# Patient Record
Sex: MALE | Race: WHITE
[De-identification: names, ages, dates, MRNs, and addresses within clinical notes are randomized per-mention and may not be internally consistent; named-entity substitution may affect disease eponyms.]

---

## 2019-01-14 ENCOUNTER — HOSPITAL ENCOUNTER (EMERGENCY)
Dept: HOSPITAL 41 - JD.ED | Age: 24
Discharge: HOME | End: 2019-01-14
Payer: COMMERCIAL

## 2019-01-14 DIAGNOSIS — J11.1: Primary | ICD-10-CM

## 2019-01-14 DIAGNOSIS — F17.210: ICD-10-CM

## 2019-01-14 NOTE — EDM.PDOC
ED HPI GENERAL MEDICAL PROBLEM





- General


Chief Complaint: General


Stated Complaint: FLU LIKE SYMPTOMS


Time Seen by Provider: 01/14/19 19:09


Source of Information: Reports: Patient


History Limitations: Reports: No Limitations





- History of Present Illness


INITIAL COMMENTS - FREE TEXT/NARRATIVE: 





23-year-old male attends the ED with acute onset of upper respiratory tract 

symptoms. He states shortly after going to work this morning he developed 

generalized body aches headache, and fever. Also has developed a paroxysmal 

relatively nonproductive cough. States his throat is little scratchy. Is a neat 

and all day but he is hungry. Denies any nausea vomiting or diarrhea. He states 

many of his coworkers have been recently diagnosed with influenza. He did not 

have a flu shot.


Onset: Today


Onset Date: 01/14/19


Onset Time: 08:00


Duration: Hour(s):


Location: Reports: Chest (Paroxysmal nonproductive cough with mild sore throat)

, Generalized


Quality: Reports: Other


Severity: Moderate (Generalized body ache and headache)


Improves with: Reports: None


Worsens with: Reports: None


Context: Denies: Activity, Exercise, Lifting, Sick Contact, Trauma


Associated Symptoms: Reports: Cough, cough w sputum, Fever/Chills, Headaches, 

Malaise.  Denies: No Other Symptoms, Confusion, Chest Pain (Scant sputum 

production), Loss of Appetite, Nausea/Vomiting, Rash, Seizure, Shortness of 

Breath, Syncope


Treatments PTA: Reports: Other (see below)


  ** Generalized


Pain Score (Numeric/FACES): 4





- Related Data


 Allergies











Allergy/AdvReac Type Severity Reaction Status Date / Time


 


No Known Allergies Allergy   Verified 01/14/19 19:06











Home Meds: 


 Home Meds





Hydrocodone/Chlorphen P-Stirex [Tussionex Pennkinetic Susp] 5 ml PO Q12H PRN #

60 ml 01/14/19 [Rx]


Oseltamivir [Tamiflu] 75 mg PO BID #10 cap 01/14/19 [Rx]











Past Medical History


Genitourinary History: Reports: Other (See Below)


Other Genitourinary History: kidney stones





- Past Surgical History


HEENT Surgical History: Reports: Tonsillectomy





Social & Family History





- Family History


Family Medical History: Noncontributory





- Caffeine Use


Caffeine Use: Reports: Soda





- Living Situation & Occupation


Living situation: Reports: 


Occupation: Employed





ED ROS GENERAL





- Review of Systems


Review Of Systems: See Below


Constitutional: Reports: Fever, Chills, Malaise, Weakness (Starting today)


HEENT: Reports: Throat Pain (States his throat feels scratchy.)


Respiratory: Reports: Cough, Sputum.  Denies: Wheezing, Pleuritic Chest Pain, 

Hemoptysis (Scans sputum production)


Cardiovascular: Reports: No Symptoms


Endocrine: Reports: Fatigue


GI/Abdominal: Reports: No Symptoms


: Reports: No Symptoms


Musculoskeletal: Reports: Muscle Pain


Skin: Reports: No Symptoms (Generalized myalgia)


Neurological: Reports: No Symptoms


Psychiatric: Reports: No Symptoms


Hematologic/Lymphatic: Reports: No Symptoms


Immunologic: Reports: No Symptoms





ED EXAM, GENERAL





- Physical Exam


Exam: See Below


Exam Limited By: No Limitations


General Appearance: Alert, WD/WN, No Apparent Distress, Other (Patient is warm 

to palpation. Nurses record his temperature 36.8 but he is much warmer than 

this.)


Eye Exam: Bilateral Eye: Normal Inspection


Ears: Other (Physical illness and slight fluid behind his left tympanic 

membrane. The right is normal.)


Throat/Mouth: Other


Head: Atraumatic, Normocephalic


Neck: Normal Inspection, Supple, Non-Tender, Full Range of Motion, 

Lymphadenopathy (L) (Slight submandibular adenopathy on the left side).  No: 

Lymphadenopathy (R)


Respiratory/Chest: No Respiratory Distress, Lungs Clear, Normal Breath Sounds, 

No Accessory Muscle Use


Cardiovascular: Normal Peripheral Pulses, Regular Rate, Rhythm, No Edema, No 

Gallop, No Murmur, No Rub


Extremities: Normal Inspection, Normal Range of Motion, Non-Tender, No Pedal 

Edema


Neurological: Alert, Oriented, CN II-XII Intact, Normal Cognition


Psychiatric: Normal Affect, Normal Mood


Skin Exam: Warm, Dry, Intact, Normal Color, No Rash





Course





- Vital Signs


Last Recorded V/S: 


 Last Vital Signs











Temp  36.8 C   01/14/19 19:06


 


Pulse  76   01/14/19 19:06


 


Resp  18   01/14/19 19:06


 


BP  138/79   01/14/19 19:06


 


Pulse Ox  98   01/14/19 19:06














- Radiology Interpretation


Free Text/Narrative:: 


23-year-old male attends the ED with acute onset of illness this morning 

shortly after going to work. Developed upper spine to tract symptoms with mild 

scratchy throat and a paroxysmal nonproductive cough. Has generalized myalgia 

such with a headache. Has not eaten all  day but states he is hungry. He did 

not receive a flu shot. States many of his coworkers have been recently 

diagnosed with influenza. Examination reveals no signs of a bacterial 

infection. Clinically has SYMPTOMS OF INFLUENZA. HE WILL BE TREATED THEREFORE 

WITH TAMIFLU 75 MG TWICE A DAY FOR THE NEXT 10 DAYS. COUGH SYRUP WITH TUSSIONEX 

COUGH SYRUP 5 MILS EVERY 12 HOURS. FOR COUGH RELIEF. TINEA MOTRIN 600 MG EVERY 

6 HOURS AS NEEDED FOR FEVER, CHILLS, HEADACHE, BODY ACHES.








Departure





- Departure


Time of Disposition: 19:15


Disposition: Home, Self-Care 01


Condition: Fair


Clinical Impression: 


 Influenza








- Discharge Information


*PRESCRIPTION DRUG MONITORING PROGRAM REVIEWED*: Not Applicable


*COPY OF PRESCRIPTION DRUG MONITORING REPORT IN PATIENT SHONDA: Not Applicable


Prescriptions: 


Hydrocodone/Chlorphen P-Stirex [Tussionex Pennkinetic Susp] 5 ml PO Q12H PRN #

60 ml


 PRN Reason: cough relief


Oseltamivir [Tamiflu] 75 mg PO BID #10 cap


Instructions:  Influenza, Adult, Easy-to-Read


Referrals: 


PCP,None [Primary Care Provider] - 


Forms:  ED Department Discharge


Additional Instructions: 


Evaluation the emergent tonight in regards to acute onset of upper respiratory 

tract infection with harsh paroxysmal relatively nonproductive cough. 

Generalized body aches. Headache. Examination does not show any evidence of 

bacterial infection. Clinically you have all the signs and symptoms of 

influenza. AS YOU INDICATED MANY OF YOUR COWORKERS HAVE BEEN DIAGNOSED WITH 

INFLUENZA RECENTLY. TREATMENT IS THEREFORE ANTIVIRAL MEDICATION TAMIFLU 75 MG 

TWICE DAILY FOR THE NEXT 10 DAYS STARTING TONIGHT. Motrin 600 mg every 6 hours 

daily for relief of headache, body ache and fever. COUGH SYRUP IS TO BE 

TUSSIONEX COUGH SYRUP 5 MILS EVERY 12 HOURS AS NECESSARY FOR COUGH RELIEF. Plan 

on taking the medication a good hour before laying down for the night as it 

takes a good hour to work. Can be taken with food. Considered contagious to 

others through cough droplets per week from the time symptoms develop.